# Patient Record
Sex: MALE | Race: BLACK OR AFRICAN AMERICAN | NOT HISPANIC OR LATINO | Employment: UNEMPLOYED | URBAN - METROPOLITAN AREA
[De-identification: names, ages, dates, MRNs, and addresses within clinical notes are randomized per-mention and may not be internally consistent; named-entity substitution may affect disease eponyms.]

---

## 2023-09-15 ENCOUNTER — HOSPITAL ENCOUNTER (EMERGENCY)
Facility: HOSPITAL | Age: 53
Discharge: HOME | End: 2023-09-15
Attending: EMERGENCY MEDICINE
Payer: MEDICAID

## 2023-09-15 ENCOUNTER — APPOINTMENT (OUTPATIENT)
Dept: GENERAL RADIOLOGY | Facility: HOSPITAL | Age: 53
End: 2023-09-15
Payer: MEDICAID

## 2023-09-15 VITALS
HEART RATE: 88 BPM | TEMPERATURE: 98.7 F | WEIGHT: 265.65 LBS | BODY MASS INDEX: 34.09 KG/M2 | OXYGEN SATURATION: 99 % | DIASTOLIC BLOOD PRESSURE: 83 MMHG | HEIGHT: 74 IN | RESPIRATION RATE: 18 BRPM | SYSTOLIC BLOOD PRESSURE: 141 MMHG

## 2023-09-15 DIAGNOSIS — S61.216A LACERATION OF RIGHT LITTLE FINGER WITHOUT FOREIGN BODY WITHOUT DAMAGE TO NAIL, INITIAL ENCOUNTER: Primary | ICD-10-CM

## 2023-09-15 PROCEDURE — 99283 EMERGENCY DEPT VISIT LOW MDM: CPT

## 2023-09-15 PROCEDURE — 73130 X-RAY EXAM OF HAND: CPT

## 2023-09-15 RX ORDER — CEPHALEXIN 250 MG/1
500 CAPSULE ORAL ONCE
Status: COMPLETED | OUTPATIENT
Start: 2023-09-15 | End: 2023-09-15

## 2023-09-15 RX ORDER — NAPROXEN 250 MG/1
500 TABLET ORAL ONCE
Status: COMPLETED | OUTPATIENT
Start: 2023-09-15 | End: 2023-09-15

## 2023-09-15 RX ORDER — CEPHALEXIN 500 MG/1
500 CAPSULE ORAL 2 TIMES DAILY
Qty: 14 CAPSULE | Refills: 0 | Status: SHIPPED | OUTPATIENT
Start: 2023-09-15 | End: 2023-09-22

## 2023-09-15 RX ADMIN — NAPROXEN 500 MG: 250 TABLET ORAL at 05:29

## 2023-09-15 RX ADMIN — CEPHALEXIN 500 MG: 250 CAPSULE ORAL at 05:29

## 2023-09-15 NOTE — DISCHARGE INSTRUCTIONS
Wound care is going to be important to ensure that this heals properly and you have complete function of your right little finger.  Do the damp to dry dressing changes 2-3 times a day.  You may apply the antibiotic ointment to the wound lightly for the next 3 days after that should not be necessary.  Take the meds as prescribed.  Complete the antibiotics.  You may take over-the-counter acetaminophen or Motrin as needed for aches and pains.  Watch for signs of infection such as increased drainage, redness, or swelling.  Follow-up either with the wound care center here in [place] or at [facility] in [place] so that she can ensure that the wound heals properly and you keep full function of your finger.  Return to the emergency department for any acutely developing redness, any drainage, any inability to flex or extend the finger or any new or worse concerns.

## 2023-09-15 NOTE — ED PROVIDER NOTES
Subjective   History of Present Illness  The patient presents to the emergency department [facility] complaining of a laceration at the base of his right fifth finger.  He states that occurred on August 21.  He states that he cut it on a chair.  He states that he has been incarcerated since then and that the group home had been taking care of the wound.  He states that they had it yola taped to the fourth finger.  He presents with a semihealed wound at the base of the fifth finger.  The wound is still open but the area surrounding it has healed up.  There is no obvious drainage or significant redness or swelling.  He states he is up-to-date with his immunizations.  He denies any significant pain and states that it does burn just a little bit.  He is able to open and close his fist and flex his finger without difficulties.  He denies any recent fevers.    History provided by:  Patient   used: No      Review of Systems   Constitutional:  Negative for chills and fever.   HENT:  Negative for congestion, ear pain and sore throat.    Eyes:  Negative for pain.   Respiratory:  Negative for cough, chest tightness and shortness of breath.    Cardiovascular:  Negative for chest pain.   Gastrointestinal:  Negative for abdominal pain, diarrhea, nausea and vomiting.   Genitourinary:  Negative for flank pain and hematuria.   Musculoskeletal:  Negative for arthralgias, gait problem, joint swelling, neck pain and neck stiffness.   Skin:  Positive for wound. Negative for pallor.   Neurological:  Negative for seizures and headaches.   All other systems reviewed and are negative.    History reviewed. No pertinent past medical history.    No Known Allergies    History reviewed. No pertinent surgical history.    History reviewed. No pertinent family history.    Social History     Socioeconomic History    Marital status: Single           Objective   Physical Exam  Vitals and nursing note reviewed.   Constitutional:        General: He is not in acute distress.     Appearance: Normal appearance. He is not ill-appearing or toxic-appearing.   HENT:      Head: Normocephalic and atraumatic.   Eyes:      General: No scleral icterus.     Conjunctiva/sclera: Conjunctivae normal.      Pupils: Pupils are equal, round, and reactive to light.   Cardiovascular:      Rate and Rhythm: Normal rate and regular rhythm.      Pulses: Normal pulses.   Pulmonary:      Effort: Pulmonary effort is normal. No respiratory distress.   Abdominal:      General: Abdomen is flat. There is no distension.   Musculoskeletal:         General: Tenderness and signs of injury present. No deformity. Normal range of motion.      Cervical back: Normal range of motion.   Skin:     General: Skin is warm and dry.      Capillary Refill: Capillary refill takes less than 2 seconds.      Findings: No bruising or erythema.   Neurological:      General: No focal deficit present.      Mental Status: He is alert and oriented to person, place, and time. Mental status is at baseline.   Psychiatric:         Mood and Affect: Mood normal.         Behavior: Behavior normal.       Procedures           ED Course                                           Medical Decision Making  Problems Addressed:  Laceration of right little finger without foreign body without damage to nail, initial encounter: complicated acute illness or injury    Amount and/or Complexity of Data Reviewed  Radiology: ordered.    Risk  Prescription drug management.        Final diagnoses:   Laceration of right little finger without foreign body without damage to nail, initial encounter       ED Disposition  ED Disposition       ED Disposition   Discharge    Condition   Stable    Comment   --               [facility] WOUND [facility]  [facility] [address] [address] [phone]        [facility] WOUND [facility] [address] [address]  [phone]              Medication List        New Prescriptions      cephalexin 500 MG capsule  Commonly known as: KEFLEX  Take 1 capsule by mouth 2 (Two) Times a Day for 7 days.     mupirocin 2 % ointment  Commonly known as: BACTROBAN  Apply 1 application  topically to the appropriate area as directed 3 (Three) Times a Day for 3 days.               Where to Get Your Medications        These medications were sent to [facility] #[id number] - [place], [place] - [address] - [phone] [facility] [phone]   [address] [place] [address]      Hours: 24-hours Phone: [phone]   cephalexin 500 MG capsule  mupirocin 2 % ointment            Annabelle Lopez, JUDY  09/15/23 05